# Patient Record
Sex: MALE | Race: WHITE | Employment: OTHER | URBAN - METROPOLITAN AREA
[De-identification: names, ages, dates, MRNs, and addresses within clinical notes are randomized per-mention and may not be internally consistent; named-entity substitution may affect disease eponyms.]

---

## 2017-09-05 ENCOUNTER — TELEPHONE (OUTPATIENT)
Dept: RADIATION ONCOLOGY | Facility: CLINIC | Age: 82
End: 2017-09-05

## 2017-09-05 NOTE — TELEPHONE ENCOUNTER
Call from Beata- she had received this week a Medicare Summary note for her  Carter which says she had a bill of $752 from 2013.  She called Medicare to ask why and was told this was recently submitted by our department.  Carter in our department in 2013.  He has passed away.  Voice mail left on Financial Counselor for oncology Luis Prajapati to please investigate and call this patient.  Left my contact number also.